# Patient Record
Sex: MALE | Race: WHITE | Employment: FULL TIME | URBAN - METROPOLITAN AREA
[De-identification: names, ages, dates, MRNs, and addresses within clinical notes are randomized per-mention and may not be internally consistent; named-entity substitution may affect disease eponyms.]

---

## 2023-04-05 ENCOUNTER — APPOINTMENT (OUTPATIENT)
Dept: GENERAL RADIOLOGY | Age: 23
End: 2023-04-05
Payer: COMMERCIAL

## 2023-04-05 ENCOUNTER — HOSPITAL ENCOUNTER (EMERGENCY)
Age: 23
Discharge: HOME | End: 2023-04-05
Attending: STUDENT IN AN ORGANIZED HEALTH CARE EDUCATION/TRAINING PROGRAM
Payer: COMMERCIAL

## 2023-04-05 VITALS
OXYGEN SATURATION: 100 % | RESPIRATION RATE: 18 BRPM | SYSTOLIC BLOOD PRESSURE: 151 MMHG | TEMPERATURE: 97.4 F | DIASTOLIC BLOOD PRESSURE: 99 MMHG | HEIGHT: 72 IN | HEART RATE: 87 BPM | BODY MASS INDEX: 29.8 KG/M2 | WEIGHT: 220 LBS

## 2023-04-05 DIAGNOSIS — S61.012A LACERATION OF LEFT THUMB WITHOUT FOREIGN BODY WITHOUT DAMAGE TO NAIL, INITIAL ENCOUNTER: Primary | ICD-10-CM

## 2023-04-05 PROCEDURE — 73130 X-RAY EXAM OF HAND: CPT

## 2023-04-05 PROCEDURE — 6370000000 HC RX 637 (ALT 250 FOR IP)

## 2023-04-05 PROCEDURE — 2500000003 HC RX 250 WO HCPCS

## 2023-04-05 RX ORDER — CEPHALEXIN 500 MG/1
500 CAPSULE ORAL ONCE
Status: COMPLETED | OUTPATIENT
Start: 2023-04-05 | End: 2023-04-05

## 2023-04-05 RX ORDER — CEPHALEXIN 500 MG/1
500 CAPSULE ORAL 3 TIMES DAILY
Qty: 21 CAPSULE | Refills: 0 | Status: SHIPPED | OUTPATIENT
Start: 2023-04-05 | End: 2023-04-12

## 2023-04-05 RX ORDER — LIDOCAINE HYDROCHLORIDE 10 MG/ML
5 INJECTION, SOLUTION EPIDURAL; INFILTRATION; INTRACAUDAL; PERINEURAL ONCE
Status: COMPLETED | OUTPATIENT
Start: 2023-04-05 | End: 2023-04-05

## 2023-04-05 RX ADMIN — CEPHALEXIN 500 MG: 500 CAPSULE ORAL at 17:38

## 2023-04-05 RX ADMIN — LIDOCAINE HYDROCHLORIDE 5 ML: 10 INJECTION, SOLUTION EPIDURAL; INFILTRATION; INTRACAUDAL; PERINEURAL at 17:34

## 2023-04-05 NOTE — ED PROVIDER NOTES
results found. PROCEDURES   Unless otherwise noted below, none    LACERATION REPAIR  PROCEDURE NOTE:  Unless otherwise indicated, this procedure was done or directly supervised by the ED attending. Laceration #: 1. Location: dorsal aspect of left thumb  Length: 2.5cm. The wound area was irrigated with sterile saline, cleansend with shur-clens and draped in a sterile fashion. The wound area was anesthetized with Lidocaine 1% without epinephrine. WOUND COMPLEXITY:    Debridement: None. Undermining: None. Wound Margins Revised: None. Flaps Aligned: yes. The wound was explored with the following results No foreign bodies found. The wound was closed with 4-0 Ethilon using interrupted sutures. Dressing:  a sterile dressing was placed. Total number suture: 4    PAST MEDICAL HISTORY/Chronic Conditions Affecting Care      has no past medical history on file. EMERGENCY DEPARTMENT COURSE    Vitals:    Vitals:    04/05/23 1501   BP: (!) 151/99   Pulse: 87   Resp: 18   Temp: 97.4 °F (36.3 °C)   SpO2: 100%   Weight: 220 lb (99.8 kg)   Height: 6' (1.829 m)       Patient was given the following medications:  Medications   lidocaine PF 1 % injection 5 mL (5 mLs IntraDERmal Given by Other 4/5/23 1991)   cephALEXin (KEFLEX) capsule 500 mg (500 mg Oral Given 4/5/23 7632)       Medical Decision Making/Differential Diagnosis:  CC/HPI Summary, Social Determinants of health, Records Reviewed, DDx, testing done/not done, ED Course, Reassessment, disposition considerations/shared decision making with patient, consults, disposition:        CC/HPI Summary, DDx, ED Course, Reassessment, Tests Considered, Patient expectation:   Patient presents for concerns following a laceration of the left thumb. Due to patient having inability to extend his left thumb orthopedics was consulted. They stated to irrigate the wound and prescribe antibiotics and close laceration he will follow-up outpatient.   Patient was placed in a

## 2023-04-05 NOTE — Clinical Note
Susanna Ortiz was seen and treated in our emergency department on 4/5/2023. He may return to work on 04/06/2023. Do not return to work until cleared by orthopedics. If you have any questions or concerns, please don't hesitate to call.       Jeanette Henson, DO

## 2023-04-05 NOTE — Clinical Note
Joaquin Bassett was seen and treated in our emergency department on 4/5/2023. He may return to work on 04/06/2023. Do not return to work until cleared by orthopedics. If you have any questions or concerns, please don't hesitate to call.       Virgen Alfaro, DO

## 2024-10-11 ENCOUNTER — HOSPITAL ENCOUNTER (OUTPATIENT)
Age: 24
Discharge: HOME | End: 2024-10-13